# Patient Record
(demographics unavailable — no encounter records)

---

## 2025-03-25 NOTE — HISTORY OF PRESENT ILLNESS
[FreeTextEntry1] : Ms. Rivera is a 39 year old female that presents [facility] for a cardiovascular evaluation postpartum.  She was admitted to [facility] on 2024 at 38 weeks and 3 days for an induction of labor due to the development of gestation diabetes and borderline IUGR of her baby.  Ultimately, patient was delivered by  section due to NRFHT and arrest of dilatation.  During induction, patient was found to have seizure-like activity with a loss of consciousness. She had a loss of consciousness episode during induction associated with nausea and dizziness then disorientation for 20 to 30 seconds and her pulse was 50-60 bpm and BP was 150/ 74.  Eyes rolled back and she was not responsive. When evaluated, she was not postictal per team. Her second episode preceded with nausea and postured and shaking with eye rolled back for 30-40 seconds unresponsive for which she received 1 mg of Ativan.   *Of note, patient had seizure-like episodes in the past. The first was at age 14. She "fainted" during a trip to [place] after which she was devoid of sleep and no food or water.  .An EEG was performed and then stated" she had a sensitivity to light". A second episode occured at age 15, when she was watching TV flashing.  Since then, she had a third episode at the grocery store. All those episodes lasted for 30 second s, then she woke up without postictal manifestations.  EEG and CT findings were reported to be unremarkable.   An echocardiogram was recently performed on 2024 LVEF- 62 % Normal LV diastolic  function with normal filling pressure Normal RV cavity size, wall thickness and normal systolic function Mild to moderate MR No pericardial effusion seen   +Family history of maternal prediabetes, paternal "brain tumor".   Currently, patient reports feeling well and denies any issues with shortness of breath, chest discomfort or symptomatic palpitations.

## 2025-03-25 NOTE — DISCUSSION/SUMMARY
[FreeTextEntry1] : Ms. Rivera is a 39 year old female that presents to [facility] for a cardiovascular evaluation postpartum in the setting of gestational diabetes and seizures during labor and delivery.  #History of seizures   Episodes of seizure activity at age 14/15 "sound" by history to be vagally mediated.    EEG and CT findings in the past were negative.   Recurrent seizure activity during induction of labor   Recent echocardiogram showed normal LV function   Holter worn for 9 days between 2/ 5/ 24 and 2/14/24 demonstrated predominantly underlying rhythm of   sinus. Also intermittent ***second degree AV Block- Mobitz l ([place])    Recommending a follow up appointment with neurologist, Dr. Levy Sanchez for updated neurological evaluation.  #Adverse Cardiovascular Risk Factors  Personal history of gestational diabetes and arrythmia of Wenckebach Family history of prediabetes  Recommending a baseline cardiovascular evaluation 3-4 months postpartum to assess risk In-office physical examination and 12 lead EKG - done today  Exercise stress testing to assess functional status Full blood work panel:  CBC,CMP, Hgb A1C, TSH, Lipid profile  - Encouraged patient to participate in  healthy exercise (when cleared by OB) and eating habits, focusing on a Mediterranean style of eating and aiming for the recommended 150 minutes per week of moderate physical activity.  - Encouraged the patient to find healthy outlets and coping mechanisms to help manage stress, such as physical activity/exercise, reducing workload if possible, spending time with family and friends, engaging in an enjoyable hobby, or using meditation or mindfulness techniques. - will refer to Lissette Garcia in light of the PTSD and stressful situation at home

## 2025-03-25 NOTE — HISTORY OF PRESENT ILLNESS
[FreeTextEntry1] : Ms. Rivera is a 39 year old female that presents to [facility] for a cardiovascular evaluation postpartum.  She was admitted to [facility] on 2024 at 38 weeks and 3 days for an induction of labor due to the development of gestation diabetes and borderline IUGR of her baby.  Ultimately, patient was delivered by  section due to NRFHT and arrest of dilatation.  During induction, patient was found to have seizure-like activity with a loss of consciousness. She had a loss of consciousness episode during induction associated with nausea and dizziness then disorientation for 20 to 30 seconds and her pulse was 50-60 bpm and BP was 150/ 74.  Eyes rolled back and she was not responsive. When evaluated, she was not postictal per team. Her second episode preceded with nausea and postured and shaking with eye rolled back for 30-40 seconds unresponsive for which she received 1 mg of Ativan.   *Of note, patient had seizure-like episodes in the past. The first was at age 14. She "fainted" during a trip to [place] after which she was devoid of sleep and no food or water.  .An EEG was performed and then stated" she had a sensitivity to light". A second episode occured at age 15, when she was watching TV flashing.  Since then, she had a third episode at the grocery store. All those episodes lasted for 30 second s, then she woke up without postictal manifestations.  EEG and CT findings were reported to be unremarkable.   An echocardiogram was recently performed on 2024 LVEF- 62 % Normal LV diastolic  function with normal filling pressure Normal RV cavity size, wall thickness and normal systolic function Mild to moderate MR No pericardial effusion seen   +Family history of maternal prediabetes, paternal "brain tumor".   Currently, patient reports feeling well and denies any issues with shortness of breath, chest discomfort or symptomatic palpitations.

## 2025-03-25 NOTE — HISTORY OF PRESENT ILLNESS
[FreeTextEntry1] : Ms. Rivera is a 39 year old female that presents to [facility] for a cardiovascular evaluation postpartum.  She was admitted to [facility] on 2024 at 38 weeks and 3 days for an induction of labor due to the development of gestation diabetes and borderline IUGR of her baby.  Ultimately, patient was delivered by  section due to NRFHT and arrest of dilatation.  During induction, patient was found to have seizure-like activity with a loss of consciousness. She had a loss of consciousness episode during induction associated with nausea and dizziness then disorientation for 20 to 30 seconds and her pulse was 50-60 bpm and BP was 150/ 74.  Eyes rolled back and she was not responsive. When evaluated, she was not postictal per team. Her second episode preceded with nausea and postured and shaking with eye rolled back for 30-40 seconds unresponsive for which she received 1 mg of Ativan.   *Of note, patient had seizure-like episodes in the past. The first was at age 14. She "fainted" during a trip to [place] after which she was devoid of sleep and no food or water.  .An EEG was performed and then stated" she had a sensitivity to light". A second episode occured at age 15, when she was watching TV flashing.  Since then, she had a third episode at the grocery store. All those episodes lasted for 30 second s, then she woke up without postictal manifestations.  EEG and CT findings were reported to be unremarkable.   An echocardiogram was recently performed on 2024 LVEF- 62 % Normal LV diastolic  function with normal filling pressure Normal RV cavity size, wall thickness and normal systolic function Mild to moderate MR No pericardial effusion seen   +Family history of maternal prediabetes, paternal "brain tumor".   Currently, patient reports feeling well and denies any issues with shortness of breath, chest discomfort or symptomatic palpitations.